# Patient Record
(demographics unavailable — no encounter records)

---

## 2024-12-03 NOTE — PHYSICAL EXAM
[Normal] : tympanic membranes are normal in both ears [de-identified] : CI bilaterally.  Removed with curette and suction.

## 2024-12-03 NOTE — CONSULT LETTER
[Dear  ___] : Dear  [unfilled], [Consult Letter:] : I had the pleasure of evaluating your patient, [unfilled]. [Sincerely,] : Sincerely, [FreeTextEntry2] : Dr Radha Kebede  [FreeTextEntry3] : Jay Nava MD, FACS Chief of Otolaryngology and Head & Neck Surgery [facility]  - Dept. of Otolaryngology [facility]

## 2024-12-03 NOTE — HISTORY OF PRESENT ILLNESS
[de-identified] : 17 year old male presents for follow up clogged ears  States the last couple of days his ears have been feeling clogged.  Denies otorrhea, otalgia, ear infections, hearing loss, tinnitus, dizziness, vertigo, headaches related to hearing.

## 2025-06-24 NOTE — PROCEDURE
[Unable to Cooperate with Mirror] : patient unable to cooperate with mirror [Globus] : globus [None] : none [Flexible Endoscope] : examined with the flexible endoscope [Serial Number: ___] : Serial Number: [unfilled] [Normal] : the false vocal folds were pink and regular, the ventricular sulcus was open, the true vocal folds were glistening white, tense and of equal length, mobility, and height [True Vocal Cords Paralysis] : no true vocal cord paralysis [True Vocal Cords Erythematous] : no true vocal cord edema [True Vocal Cords Sheth's Nodules] : no true vocal cord nodules [Glottis Arytenoid Cartilages] : no arytenoid granulomas [Glottis Arytenoid Cartilages Erythema] : no arytenoid erythema [Arytenoid Edema ___ /4] : arytenoid edema [unfilled]U/4 [Arytenoid Erythema ___ /4] : arytenoid erythema [unfilled]U/4 [Interarytenoid Edema] : interarytenoid area edematous [de-identified] :  Surgilube  [FreeTextEntry3] : Cobblestoning [FreeTextEntry9] : + Cobblestoning posteriorly

## 2025-06-24 NOTE — HISTORY OF PRESENT ILLNESS
[de-identified] : 18M presents for an evaluation of clogged ears. Admits to slight hearing loss. Patient denies otalgia, otorrhea, ear infections, tinnitus, dizziness, vertigo. Patient feels a constant sensation of phlegm in throat/chest with concurrent dysphonia and cough. Patient denies dysphagia, odynophagia, dysphonia, cough, heartburn, dyspnea. [phone]

## 2025-06-24 NOTE — CONSULT LETTER
[FreeTextEntry2] : Radha Kebede MD  [FreeTextEntry3] : Angelica Llanes PA-C Department of Otolaryngology [facility] Otolaryngology at [place]   Jay Nava MD, FACS Chief of Otolaryngology at [facility]  Dept. of Otolaryngology [facility]

## 2025-06-24 NOTE — PHYSICAL EXAM
[de-identified] : CI bilaterally.  Removed. [Laryngoscopy Performed] : laryngoscopy was performed, see procedure section for findings